# Patient Record
Sex: FEMALE | Race: WHITE | URBAN - METROPOLITAN AREA
[De-identification: names, ages, dates, MRNs, and addresses within clinical notes are randomized per-mention and may not be internally consistent; named-entity substitution may affect disease eponyms.]

---

## 2024-11-20 ENCOUNTER — APPOINTMENT (OUTPATIENT)
Age: 49
End: 2024-11-20
Payer: COMMERCIAL

## 2024-11-20 ENCOUNTER — HOSPITAL ENCOUNTER (EMERGENCY)
Age: 49
Discharge: HOME | End: 2024-11-20
Attending: EMERGENCY MEDICINE
Payer: COMMERCIAL

## 2024-11-20 VITALS
TEMPERATURE: 98 F | BODY MASS INDEX: 37.01 KG/M2 | SYSTOLIC BLOOD PRESSURE: 122 MMHG | WEIGHT: 249.9 LBS | DIASTOLIC BLOOD PRESSURE: 67 MMHG | HEART RATE: 86 BPM | HEIGHT: 69 IN | OXYGEN SATURATION: 97 % | RESPIRATION RATE: 16 BRPM

## 2024-11-20 DIAGNOSIS — S20.211A RIB CONTUSION, RIGHT, INITIAL ENCOUNTER: ICD-10-CM

## 2024-11-20 DIAGNOSIS — S16.1XXA STRAIN OF NECK MUSCLE, INITIAL ENCOUNTER: ICD-10-CM

## 2024-11-20 DIAGNOSIS — S63.501A SPRAIN OF RIGHT WRIST, INITIAL ENCOUNTER: Primary | ICD-10-CM

## 2024-11-20 DIAGNOSIS — S50.01XA CONTUSION OF RIGHT ELBOW, INITIAL ENCOUNTER: ICD-10-CM

## 2024-11-20 DIAGNOSIS — S09.90XA CLOSED HEAD INJURY, INITIAL ENCOUNTER: ICD-10-CM

## 2024-11-20 PROCEDURE — 71250 CT THORAX DX C-: CPT

## 2024-11-20 PROCEDURE — 72125 CT NECK SPINE W/O DYE: CPT

## 2024-11-20 PROCEDURE — 70450 CT HEAD/BRAIN W/O DYE: CPT

## 2024-11-20 PROCEDURE — 99284 EMERGENCY DEPT VISIT MOD MDM: CPT

## 2024-11-20 PROCEDURE — 6370000000 HC RX 637 (ALT 250 FOR IP): Performed by: EMERGENCY MEDICINE

## 2024-11-20 PROCEDURE — 73080 X-RAY EXAM OF ELBOW: CPT

## 2024-11-20 PROCEDURE — 73110 X-RAY EXAM OF WRIST: CPT

## 2024-11-20 RX ORDER — TRAZODONE HYDROCHLORIDE 50 MG/1
50 TABLET, FILM COATED ORAL NIGHTLY
COMMUNITY

## 2024-11-20 RX ORDER — LEFLUNOMIDE 20 MG/1
20 TABLET ORAL DAILY
COMMUNITY

## 2024-11-20 RX ORDER — LEVOTHYROXINE SODIUM 137 UG/1
137 TABLET ORAL DAILY
COMMUNITY

## 2024-11-20 RX ORDER — SEMAGLUTIDE 1.7 MG/.75ML
1.7 INJECTION, SOLUTION SUBCUTANEOUS
COMMUNITY

## 2024-11-20 RX ORDER — BUPROPION HYDROCHLORIDE 300 MG/1
300 TABLET ORAL EVERY MORNING
COMMUNITY

## 2024-11-20 RX ORDER — LOSARTAN POTASSIUM 50 MG/1
50 TABLET ORAL DAILY
COMMUNITY

## 2024-11-20 RX ORDER — DULOXETIN HYDROCHLORIDE 60 MG/1
60 CAPSULE, DELAYED RELEASE ORAL DAILY
COMMUNITY

## 2024-11-20 RX ORDER — ACETAMINOPHEN 325 MG/1
650 TABLET ORAL ONCE
Status: COMPLETED | OUTPATIENT
Start: 2024-11-20 | End: 2024-11-20

## 2024-11-20 RX ADMIN — ACETAMINOPHEN 650 MG: 325 TABLET ORAL at 15:01

## 2024-11-20 ASSESSMENT — PAIN - FUNCTIONAL ASSESSMENT
PAIN_FUNCTIONAL_ASSESSMENT: PREVENTS OR INTERFERES SOME ACTIVE ACTIVITIES AND ADLS
PAIN_FUNCTIONAL_ASSESSMENT: 0-10

## 2024-11-20 ASSESSMENT — PAIN DESCRIPTION - PAIN TYPE: TYPE: ACUTE PAIN

## 2024-11-20 ASSESSMENT — PAIN DESCRIPTION - DESCRIPTORS: DESCRIPTORS: ACHING

## 2024-11-20 ASSESSMENT — PAIN SCALES - GENERAL: PAINLEVEL_OUTOF10: 9

## 2024-11-20 ASSESSMENT — PAIN DESCRIPTION - LOCATION: LOCATION: ARM

## 2024-11-20 ASSESSMENT — PAIN DESCRIPTION - ORIENTATION: ORIENTATION: RIGHT

## 2024-11-20 NOTE — ED PROVIDER NOTES
Dept  [address] [facility] [place] [address]  [phone]  Go to   If symptoms worsen    FINAL IMPRESSION:    1. Sprain of right wrist, initial encounter    2. Contusion of right elbow, initial encounter    3. Closed head injury, initial encounter    4. Strain of neck muscle, initial encounter    5. Rib contusion, right, initial encounter        (Please note that I used voice recognition software to generate this note.  Occasionally words are mistranscribed despite my efforts to edit errors.)      Ioana Easley MD  11/20/24 1482

## 2024-11-20 NOTE — ED NOTES
Verbal and written discharge instructions along with 0 prescription(s) discussed with patient. Patient verbally acknowledged all information provided. Patient is alert and acting appropriately for developmental age. Skin p/w/d. Respirations even and unlabored. Patient is to follow up as indicated. Encouraged to return patient to ED for any new or worsening symptoms. Patient denies any questions, needs, or concerns to note.